# Patient Record
Sex: FEMALE | Race: WHITE
[De-identification: names, ages, dates, MRNs, and addresses within clinical notes are randomized per-mention and may not be internally consistent; named-entity substitution may affect disease eponyms.]

---

## 2018-11-19 ENCOUNTER — HOSPITAL ENCOUNTER (OUTPATIENT)
Dept: HOSPITAL 89 - ER | Age: 83
Setting detail: OBSERVATION
LOS: 1 days | Discharge: HOME | End: 2018-11-20
Attending: INTERNAL MEDICINE | Admitting: INTERNAL MEDICINE
Payer: MEDICARE

## 2018-11-19 VITALS — DIASTOLIC BLOOD PRESSURE: 61 MMHG | SYSTOLIC BLOOD PRESSURE: 116 MMHG

## 2018-11-19 VITALS — SYSTOLIC BLOOD PRESSURE: 111 MMHG | DIASTOLIC BLOOD PRESSURE: 66 MMHG

## 2018-11-19 VITALS
WEIGHT: 130 LBS | WEIGHT: 130 LBS | HEIGHT: 60 IN | HEIGHT: 60 IN | BODY MASS INDEX: 25.52 KG/M2 | BODY MASS INDEX: 25.52 KG/M2

## 2018-11-19 DIAGNOSIS — T50.1X5A: ICD-10-CM

## 2018-11-19 DIAGNOSIS — E87.6: Primary | ICD-10-CM

## 2018-11-19 DIAGNOSIS — Z85.528: ICD-10-CM

## 2018-11-19 LAB — PLATELET COUNT, AUTOMATED: 392 K/UL (ref 150–450)

## 2018-11-19 PROCEDURE — 84484 ASSAY OF TROPONIN QUANT: CPT

## 2018-11-19 PROCEDURE — 84132 ASSAY OF SERUM POTASSIUM: CPT

## 2018-11-19 PROCEDURE — 84155 ASSAY OF PROTEIN SERUM: CPT

## 2018-11-19 PROCEDURE — 84450 TRANSFERASE (AST) (SGOT): CPT

## 2018-11-19 PROCEDURE — 96361 HYDRATE IV INFUSION ADD-ON: CPT

## 2018-11-19 PROCEDURE — 96372 THER/PROPH/DIAG INJ SC/IM: CPT

## 2018-11-19 PROCEDURE — 83735 ASSAY OF MAGNESIUM: CPT

## 2018-11-19 PROCEDURE — 82310 ASSAY OF CALCIUM: CPT

## 2018-11-19 PROCEDURE — 82374 ASSAY BLOOD CARBON DIOXIDE: CPT

## 2018-11-19 PROCEDURE — 82040 ASSAY OF SERUM ALBUMIN: CPT

## 2018-11-19 PROCEDURE — 85025 COMPLETE CBC W/AUTO DIFF WBC: CPT

## 2018-11-19 PROCEDURE — 87493 C DIFF AMPLIFIED PROBE: CPT

## 2018-11-19 PROCEDURE — 93005 ELECTROCARDIOGRAM TRACING: CPT

## 2018-11-19 PROCEDURE — 84075 ASSAY ALKALINE PHOSPHATASE: CPT

## 2018-11-19 PROCEDURE — 84520 ASSAY OF UREA NITROGEN: CPT

## 2018-11-19 PROCEDURE — 84460 ALANINE AMINO (ALT) (SGPT): CPT

## 2018-11-19 PROCEDURE — 96360 HYDRATION IV INFUSION INIT: CPT

## 2018-11-19 PROCEDURE — 99284 EMERGENCY DEPT VISIT MOD MDM: CPT

## 2018-11-19 PROCEDURE — 36415 COLL VENOUS BLD VENIPUNCTURE: CPT

## 2018-11-19 PROCEDURE — 99283 EMERGENCY DEPT VISIT LOW MDM: CPT

## 2018-11-19 PROCEDURE — 82435 ASSAY OF BLOOD CHLORIDE: CPT

## 2018-11-19 PROCEDURE — 84295 ASSAY OF SERUM SODIUM: CPT

## 2018-11-19 PROCEDURE — 82565 ASSAY OF CREATININE: CPT

## 2018-11-19 PROCEDURE — 82947 ASSAY GLUCOSE BLOOD QUANT: CPT

## 2018-11-19 PROCEDURE — 82247 BILIRUBIN TOTAL: CPT

## 2018-11-19 RX ADMIN — SODIUM CHLORIDE PRN MLS/HR: 900 INJECTION, SOLUTION INTRAVENOUS at 17:28

## 2018-11-19 RX ADMIN — POTASSIUM CHLORIDE SCH MLS/HR: 200 INJECTION, SOLUTION INTRAVENOUS at 21:29

## 2018-11-19 NOTE — EKG
FACILITY: [facility] - [place] 

 

PATIENT NAME: KRUT HOPE

: [id number]

MR: [id number]

V: [id number]

EXAM DATE: 

ORDERING PHYSICIAN: STAN WRIGHT

TECHNOLOGIST: 

 

Test Reason : 

Blood Pressure : ***/*** mmHG

Vent. Rate : 083 BPM     Atrial Rate : 083 BPM

   P-R Int : 132 ms          QRS Dur : 074 ms

    QT Int : 402 ms       P-R-T Axes : 065 029 269 degrees

   QTc Int : 472 ms

 

Normal sinus rhythm

T wave abnormality, consider anterolateral ischemia

Prolonged QT

Abnormal ECG

No previous ECGs available

Confirmed by Alexander Friedman ([id number]) on 2018 8:14:34 PM

 

Referred By:             Confirmed By:Alexander Grossman

## 2018-11-19 NOTE — ER REPORT
History and Physical


Time Seen By MD:  16:07


HPI/ROS


CHIEF COMPLAINT: Hypokalemia





HISTORY OF PRESENT ILLNESS: 85-year-old female patient presents to emergency 


room with complaint of hypokalemia. Patient was following up with her primary 


care provider who checked some labs. At that time she was found to have a 


potassium of 2.0. He was his recommendation at that time that she come to the 


emergency room for evaluation and ultimately admission to the hospital. Patient 


states that she is not having any chest pain. She states she does have pain to 


the right leg, she does have a pathologic fracture to that leg. She denies any 


nausea, vomiting or diarrhea. Patient was placed on Lasix for 5 days and 


reviewing her medication list did not have supplemental potassium were ordered. 


Patient denies any fevers, chills, nausea, vomiting or diarrhea.





REVIEW OF SYSTEMS:


Respiratory: No cough, no dyspnea.


Cardiovascular: No chest pain, no palpitations.


Gastrointestinal: No vomiting, no abdominal pain.


Musculoskeletal: As noted above


Allergies:  


Coded Allergies:  


     Penicillins (Verified  Allergy, Unknown, 11/19/18)


Home Meds


Reported Medications


Mirtazapine (REMERON) 15 Mg Tab.rapdis, 7.5 MG PO DAILY


   11/19/18


Sennosides (SENNA) 8.6 Mg Tablet, 8.6 MG PO


   11/19/18


Ibuprofen (IBUPROFEN) 400 Mg Tablet, 1 TAB PO Q8H, TAB


   11/19/18


Nystatin (NYSTATIN) 500,000 Unit Tablet, 275046 UNIT PO


   11/19/18


Lidocaine/Menthol (LIDOPATCH) 1 Each Adh..patch, 1 EACH TP


   11/19/18


Lidocaine Hcl (LIDOCAINE) 35.44 Gm Oint, 5 GM TP


   11/19/18


Tuberculin,Purif.prot.deriv. (APLISOL) 5 Tub Unit/0.1 Ml Vial, 5 TUB ID, VIAL


   11/19/18


Acetaminophen 500 Mg Tab (ACETAMINOPHEN EXTRA STRENGTH) 500 Mg Tablet, 500 MG PO


Q4H, TAB


   11/19/18


Oxycodone Hcl (OXYCONTIN) 10 Mg Tab.er.12h, 5 MG PO Q4-6H for PAIN, TAB


   11/19/18


Magnesium Hydroxide (MILK OF MAGNESIA) 400 Mg/5 Ml Oral.susp, 400 MG PO, BOTTLE


   11/19/18


Past Medical/Surgical History


Patient has a past medical history of renal cell carcinoma.


Patient has surgical history of right hip replacement, cataract surgery, 


hysterectomy, right carpal tunnel release.


Patient has a family medical history of uterine cancer.


Reviewed Nurses Notes:  Yes


Constitutional





Vital Sign - Last 24 Hours








 11/19/18 11/19/18 11/19/18 11/19/18





 16:02 16:11 16:13 16:15


 


Temp   97.4 


 


Pulse ???  85 


 


Resp   14 


 


B/P (MAP)  94/54 (67) 94/54 93/57 (69)


 


Pulse Ox   97 


 


O2 Delivery   Nasal Cannula 


 


    





 11/19/18 11/19/18 11/19/18 11/19/18





 16:17 16:30 16:32 16:45


 


Pulse 82  82 


 


Resp 18  13 


 


B/P (MAP)  75/58 (64)  96/59 (71)


 


Pulse Ox 97  96 





 11/19/18 11/19/18 11/19/18 11/19/18





 16:47 17:00 17:02 17:15


 


Pulse 79  79 


 


Resp 10  12 


 


B/P (MAP)  96/55 (69)  103/57 (72)


 


Pulse Ox 99  100 





 11/19/18 11/19/18 11/19/18 11/19/18





 17:17 17:30 17:32 17:45


 


Pulse 78  81 


 


Resp 14  12 


 


B/P (MAP)  104/59 (74)  104/73 (83)


 


Pulse Ox 99  99 


 


O2 Delivery Nasal Cannula   


 


O2 Flow Rate 2   





 11/19/18 11/19/18 11/19/18 11/19/18





 17:47 17:52 18:00 18:07


 


Pulse 82 82  ???


 


Resp 15 10  15


 


B/P (MAP)   119/63 (81) 


 


Pulse Ox 97 94  





 11/19/18 11/19/18 11/19/18 11/19/18





 18:15 18:22 18:30 18:37


 


Pulse  79  79


 


Resp  8  10


 


B/P (MAP) 105/62 (76)  117/62 (80) 


 


Pulse Ox  99  99





 11/19/18   





 18:45   


 


B/P (MAP) 116/63 (80)   








Physical Exam


  General Appearance: The patient is alert, has no immediate need for airway 


protection and no current signs of toxicity. 


Respiratory: Chest is non tender, lungs are clear to auscultation.


Cardiac: regular rate and rhythm


Gastrointestinal: Abdomen is soft and non tender, no masses, bowel sounds 


normal.


Musculoskeletal:  Neck: Neck is supple and non tender.


   Extremities have full range of motion and are non tender. Patient does have 


tenderness to the right leg.


Skin: No rashes or lesions.





DIFFERENTIAL DIAGNOSIS: After history and physical exam differential diagnosis 


was considered for hypokalemia, pathologic fracture right lower extremity.





Medical Decision Making


Data Points


Result Diagram:  


11/19/18 1656                                                                   


            11/19/18 1656





Laboratory





Hematology








Test


 11/19/18


16:56


 


Red Blood Count


 4.68 M/uL


(4.17-5.56)


 


Mean Corpuscular Volume


 73.6 fL


(80.0-96.0)


 


Mean Corpuscular Hemoglobin


 22.8 pg


(26.0-33.0)


 


Mean Corpuscular Hemoglobin


Concent 31.0 g/dL


(32.0-36.0)


 


Red Cell Distribution Width


 39.2 %


(11.5-14.5)


 


Mean Platelet Volume


 8.5 fL


(7.2-11.1)


 


Neutrophils (%) (Auto)


 88.0 %


(39.4-72.5)


 


Lymphocytes (%) (Auto)


 6.0 %


(17.6-49.6)


 


Monocytes (%) (Auto)


 5.7 %


(4.1-12.4)


 


Eosinophils (%) (Auto)


 0.1 %


(0.4-6.7)


 


Basophils (%) (Auto)


 0.2 %


(0.3-1.4)


 


Nucleated RBC Relative Count


(auto) 0.0 /100WBC 





 


Neutrophils # (Auto)


 10.4 K/uL


(2.0-7.4)


 


Lymphocytes # (Auto)


 0.7 K/uL


(1.3-3.6)


 


Monocytes # (Auto)


 0.7 K/uL


(0.3-1.0)


 


Eosinophils # (Auto)


 0.0 K/uL


(0.0-0.5)


 


Basophils # (Auto)


 0.0 K/uL


(0.0-0.1)


 


Nucleated RBC Absolute Count


(auto) 0.01 K/uL 





 


Peripheral Blood Smear Yes Y/N 


 


Sodium Level


 140 mmol/L


(137-145)


 


Potassium Level


 2.5 mmol/L


(3.5-5.0)


 


Chloride Level


 101 mmol/L


()


 


Carbon Dioxide Level


 31 mmol/L


(22-31)


 


Blood Urea Nitrogen


 20 mg/dl


(7-18)


 


Creatinine


 0.60 mg/dl


(0.52-1.04)


 


Glomerular Filtration Rate


Calc > 60.0 





 


Random Glucose


 83 mg/dl


()


 


Calcium Level


 7.8 mg/dl


(8.4-10.2)


 


Magnesium Level


 2.6 mg/dl


(1.7-2.2)


 


Total Bilirubin


 0.6 mg/dl


(0.2-1.3)


 


Aspartate Amino Transf


(AST/SGOT) 27 U/L (0-35) 





 


Alanine Aminotransferase


(ALT/SGPT) 21 U/L (0-56) 





 


Alkaline Phosphatase


 385 U/L


(0-126)


 


Troponin I 0.031 ng/ml 


 


Total Protein


 5.7 g/dl


(6.3-8.2)


 


Albumin


 2.2 g/dl


(3.5-5.0)








Chemistry








Test


 11/19/18


16:56


 


White Blood Count


 11.9 k/uL


(4.5-11.0)


 


Red Blood Count


 4.68 M/uL


(4.17-5.56)


 


Hemoglobin


 10.7 g/dL


(12.0-16.0)


 


Hematocrit


 34.5 %


(34.0-47.0)


 


Mean Corpuscular Volume


 73.6 fL


(80.0-96.0)


 


Mean Corpuscular Hemoglobin


 22.8 pg


(26.0-33.0)


 


Mean Corpuscular Hemoglobin


Concent 31.0 g/dL


(32.0-36.0)


 


Red Cell Distribution Width


 39.2 %


(11.5-14.5)


 


Platelet Count


 392 K/uL


(150-450)


 


Mean Platelet Volume


 8.5 fL


(7.2-11.1)


 


Neutrophils (%) (Auto)


 88.0 %


(39.4-72.5)


 


Lymphocytes (%) (Auto)


 6.0 %


(17.6-49.6)


 


Monocytes (%) (Auto)


 5.7 %


(4.1-12.4)


 


Eosinophils (%) (Auto)


 0.1 %


(0.4-6.7)


 


Basophils (%) (Auto)


 0.2 %


(0.3-1.4)


 


Nucleated RBC Relative Count


(auto) 0.0 /100WBC 





 


Neutrophils # (Auto)


 10.4 K/uL


(2.0-7.4)


 


Lymphocytes # (Auto)


 0.7 K/uL


(1.3-3.6)


 


Monocytes # (Auto)


 0.7 K/uL


(0.3-1.0)


 


Eosinophils # (Auto)


 0.0 K/uL


(0.0-0.5)


 


Basophils # (Auto)


 0.0 K/uL


(0.0-0.1)


 


Nucleated RBC Absolute Count


(auto) 0.01 K/uL 





 


Peripheral Blood Smear Yes Y/N 


 


Glomerular Filtration Rate


Calc > 60.0 





 


Calcium Level


 7.8 mg/dl


(8.4-10.2)


 


Magnesium Level


 2.6 mg/dl


(1.7-2.2)


 


Total Bilirubin


 0.6 mg/dl


(0.2-1.3)


 


Aspartate Amino Transf


(AST/SGOT) 27 U/L (0-35) 





 


Alanine Aminotransferase


(ALT/SGPT) 21 U/L (0-56) 





 


Alkaline Phosphatase


 385 U/L


(0-126)


 


Troponin I 0.031 ng/ml 


 


Total Protein


 5.7 g/dl


(6.3-8.2)


 


Albumin


 2.2 g/dl


(3.5-5.0)











EKG/Imaging


EKG Interpretation


12 lead EKG:


      Rhythm: normal sinus rhythm with a ventricular rate of 83 bpm


      Axis: normal 


      QRS: normal


      ST segments: Flattened T waves consistent with hypokalemia.





ED Course/Re-evaluation


ED Course


Patient was admitted on exam room, history and physical were obtained. Differen


tial diagnoses were considered. On examination patient is alert and oriented, 


lungs are clear, abdomen is soft and nontender. Lab work was repeated as patient


was sent over for a low potassium. Again the lab work confirmed patient does 


have a low potassium of 2.5. I discussed the case with Dr. Castro, 


hospitalist. He did agree to accept the patient for admission with diagnosis of 


hypokalemia. Patient was started on K-rider here in the emergency room. Patient 


verbalized understanding and agreement with plan. I do believe that the 


underlying cause of the hypokalemia is the Lasix that she was on for 5 days.


Decision to Disposition Date:  Nov 19, 2018


Decision to Disposition Time:  17:52





Depart


Departure


Latest Vital Signs





Vital Signs








  Date Time  Temp Pulse Resp B/P (MAP) Pulse Ox O2 Delivery O2 Flow Rate FiO2


 


11/19/18 18:45    116/63 (80)    


 


11/19/18 18:37  79 10  99   


 


11/19/18 17:17      Nasal Cannula 2 


 


11/19/18 16:13 97.4       








Impression:  


   Primary Impression:  


   Hypokalemia


Condition:  Condition Unchanged


Disposition:  Admitted from ER


Referrals:  


MAYRA MCKEON MD (PCP)











STAN WRIGHT                Nov 19, 2018 16:07

## 2018-11-19 NOTE — HISTORY & PHYSICAL
History of Present Illness


Chief Complaint


low potassium


History of Present Illness


85F presented to [facility] ER from [facility] with low potassium. Reported 


2.0 on lab there today, no symptoms. Repeat 2.5, recommended for observation and


PRN replacement. Recently had 5 day course Lasix without supplementation. PMHx 


significant for RCC metastatic from 11.2016.





History


Problems:  


(1) Renal cell carcinoma


Status:  Chronic


Home Meds


Reported Medications


Mirtazapine (REMERON) 15 Mg Tab.rapdis, 7.5 MG PO DAILY


   11/19/18


Sennosides (SENNA) 8.6 Mg Tablet, 8.6 MG PO


   11/19/18


Ibuprofen (IBUPROFEN) 400 Mg Tablet, 1 TAB PO Q8H, TAB


   11/19/18


Nystatin (NYSTATIN) 500,000 Unit Tablet, 140912 UNIT PO


   11/19/18


Lidocaine/Menthol (LIDOPATCH) 1 Each Adh..patch, 1 EACH TP


   11/19/18


Lidocaine Hcl (LIDOCAINE) 35.44 Gm Oint, 5 GM TP


   11/19/18


Tuberculin,Purif.prot.deriv. (APLISOL) 5 Tub Unit/0.1 Ml Vial, 5 TUB ID, VIAL


   11/19/18


Acetaminophen 500 Mg Tab (ACETAMINOPHEN EXTRA STRENGTH) 500 Mg Tablet, 500 MG PO


Q4H, TAB


   11/19/18


Oxycodone Hcl (OXYCONTIN) 10 Mg Tab.er.12h, 5 MG PO Q4-6H for PAIN, TAB


   11/19/18


Magnesium Hydroxide (MILK OF MAGNESIA) 400 Mg/5 Ml Oral.susp, 400 MG PO, BOTTLE


   11/19/18


Allergies:  


Coded Allergies:  


     Penicillins (Verified  Allergy, Unknown, 11/19/18)





Review of Systems


All Systems Reviewed/Normal:  Yes, Except as Noted


Constitutional:  No Weight Loss


Cardiovascular:  No Chest Pain, No Palpitations


Musculoskeletal:  No Pain





Exam


Vital Signs





Vital Signs








  Date Time  Temp Pulse Resp B/P (MAP) Pulse Ox O2 Delivery O2 Flow Rate FiO2


 


11/19/18 19:42  ???      


 


11/19/18 19:22   11  99   


 


11/19/18 19:15    113/63 (80)    


 


11/19/18 17:17      Nasal Cannula 2 


 


11/19/18 16:13 97.4       








General Appearance:  Awake, Afebrile, Other (Sleeping comfortably, appears 


emaciated, undernourished. )


Neuro:  No Gross deficits


ENT:  Normal (hirsutism)


Cardiovascular:  Normal Rhythm & Peripheral Pulses


Respiratory:  No Respiratory Distress


GI:  Abd Soft and Non-Tender


Musculoskeletal:  No Weakness/Pain (Imobilizer for femur Fx, likely pathologic 


Fx)


Extremities:  Soft and Non Tender, Warm, Pulses, Perfused, Edema


Integumentary:  Skin Intact without Lesion / Mass





Medical Decision Making


Data Points


Result Diagram:  


11/19/18 1656                                                                   


            11/19/18 1656








Assessment and Plan


Problems:  


(1) Hypokalemia


Status:  Acute


Assessment & Plan:  K 2.5 on admission, will monitor on telemetry while 


replacing. Likely from poor intake given appearance and Lasix. 





(2) Renal cell carcinoma


Status:  Chronic


Assessment & Plan:  Metastatic per records 11.2016. Family elected palliation. 


Elevated alk phos likely indication of joel mets. Has pahtologic Fx as well of 


femur. 








Venous Thromboembolism


Antithrombotics


Is Pt On Any Antithrombotics?:  Yes





Exam


Sepsis Risk:  No Definite Risk











IRVIN KAYDEN CASTILLO DO       Nov 19, 2018 19:58

## 2018-11-20 VITALS — SYSTOLIC BLOOD PRESSURE: 116 MMHG | DIASTOLIC BLOOD PRESSURE: 62 MMHG

## 2018-11-20 VITALS — SYSTOLIC BLOOD PRESSURE: 105 MMHG | DIASTOLIC BLOOD PRESSURE: 58 MMHG

## 2018-11-20 RX ADMIN — POTASSIUM CHLORIDE SCH MLS/HR: 200 INJECTION, SOLUTION INTRAVENOUS at 01:58

## 2018-11-20 NOTE — HOSPITALIST DEPART
Discharge Summary


Reason for Hosp/Final Diag:  


(1) Hypokalemia


Status:  Acute


Hospital Course & Plan:  K 2.5 on admission. Likely from poor intake and Lasix. 


Her Mg was wnl.  She was replaced with IV and PO KCL.  Now it is wnl.  She will 


go back to the [facility] and have oral KCL in powder form for a week.  BMP on 11/23.





(2) Renal cell carcinoma


Status:  Chronic


Hospital Course & Plan:  Metastatic per records 11.2016. Family elected 


palliation. Elevated alk phos likely indication of joel mets. Has pahtologic Fx


as well of femur. 





Departure


Weight (Pounds):  130


Result Diagram:  


11/19/18 1656 11/20/18 0543














Item Value  Date Time


 


Neutrophils (%) (Auto) 88.0 % H 11/19/18 1656


 


Lymphocytes (%) (Auto) 6.0 % L 11/19/18 1656


 


Monocytes (%) (Auto) 5.7 % 11/19/18 1656


 


Eosinophils (%) (Auto) 0.1 % L 11/19/18 1656


 


Basophils (%) (Auto) 0.2 % L 11/19/18 1656


 


Nucleated RBC Relative Count (auto) 0.0 /100WBC 11/19/18 1656


 


C. difficile Toxin B Gene (PCR) Negative 11/19/18 2200


 


Potassium Level 2.5 mmol/L *L 11/19/18 1656


 


Potassium Level 3.5 mmol/L 11/20/18 0543


 


Magnesium Level 2.6 mg/dl H 11/19/18 1656


 


Total Bilirubin 0.6 mg/dl 11/19/18 1656


 


Aspartate Amino Transf (AST/SGOT) 27 U/L 11/19/18 1656


 


Alanine Aminotransferase (ALT/SGPT) 21 U/L 11/19/18 1656


 


Alkaline Phosphatase 385 U/L H 11/19/18 1656


 


Troponin I 0.031 ng/ml 11/19/18 1656


 


Total Protein 5.7 g/dl L 11/19/18 1656


 


Albumin 2.2 g/dl L 11/19/18 1656








EKG


Vent. Rate : 083 BPM     Atrial Rate : 083 BPM


   P-R Int : 132 ms          QRS Dur : 074 ms


    QT Int : 402 ms       P-R-T Axes : 065 029 269 degrees


   QTc Int : 472 ms


 


Normal sinus rhythm


T wave abnormality, consider anterolateral ischemia


Prolonged QT


Abnormal ECG


No previous ECGs available


Confirmed by Alexander Friedman ([id number]) on 11/19/2018 8:14:34 PM


 


Referred By:             Confirmed By:Alexander Grossman


Condition:  Improved


Discharge:  Home





Discharge Instructions


Home Meds


Active Scripts


Potassium Chloride (POTASSIUM CHLORIDE) 20 Meq Packet, 20 MEQ PO QDAY, #7 PACKET


   Prov:MICHOACANO SILVA MD         11/20/18


Reported Medications


Mirtazapine (REMERON) 15 Mg Tab.rapdis, 7.5 MG PO DAILY


   11/19/18


Sennosides (SENNA) 8.6 Mg Tablet, 8.6 MG PO Q12H PRN for constipation


   11/19/18


Nystatin (NYSTATIN) 500,000 Unit Tablet, [id number] UNIT PO


   11/19/18


Lidocaine/Menthol (LIDOPATCH) 1 Each Adh..patch, 1 EACH TP


   11/19/18


Lidocaine Hcl (LIDOCAINE) 35.44 Gm Oint, 5 GM TP


   11/19/18


Acetaminophen 500 Mg Tab (ACETAMINOPHEN EXTRA STRENGTH) 500 Mg Tablet, 500 MG PO


Q4H, TAB


   11/19/18


Oxycodone Hcl (OXYCONTIN) 10 Mg Tab.er.12h, 5 MG PO Q4-6H for PAIN, TAB


   11/19/18


Magnesium Hydroxide (MILK OF MAGNESIA) 400 Mg/5 Ml Oral.susp, 400 MG PO, BOTTLE


   11/19/18


Discontinued Reported Medications


Ibuprofen (IBUPROFEN) 400 Mg Tablet, 1 TAB PO Q8H, TAB


   11/19/18


Tuberculin,Purif.prot.deriv. (APLISOL) 5 Tub Unit/0.1 Ml Vial, 5 TUB ID, VIAL


   11/19/18


Diet:  Regular


Activity:  As Tolerated


Special Instructions:  


BMP on 11/23.





Follow up with PCP in a couple of days to a week.


Copies to:   CHALO OSORIO MD ;





Venous Thromboembolism


Antithrombotics


Is Pt On Any Antithrombotics?:  Yes











MICHOACANO SILVA MD               Nov 20, 2018 12:04

## 2018-11-20 NOTE — MEDICAL NUTRITION THERAPY
Nutrition Anthropometrics


Height (Inches):  60.00


Height (Calculated Centimeters:  152.073655


Weight (Pounds):  130


Weight (Calculated Kilograms):  58.967


BMI:  25.4


Teodoro Nutrition Score:         Probably Inadequate 


Teodoro Nutrition Risk Score:  12


Dietary Referral


Nutrition Risk Factors:      


Nutrition Risk Comment:





Physical Findings


Physical Appearance:  Overweight BMI 25-29


Skin Appearance


Skin Appearance:


Edema


Edema Location Modifier: Both 


Edema Location:              Leg 


Type of Edema:                 


Degree of Edema:            3+


Gastrointestinal Symptoms


GI Symtoms:                Diarrhea, Change in Bowel Pattern 


Tube Present:               


Bowel Sounds:              


Recent Bowel Pattern:    


Stool Characteristics:    Brown, Liquid





Nutritional Diagnosis


Nutritional Risk Acuity 2:  Head/Neck/GI Cancer (Renal cell carninoma)


Nutritional Risk Acuity 3:  Fx & > 80 yrs, Cancer


Past Medical History:  


Hx of renal cell carcinoma, pneumonia


Nutritional Acuity:  2-Moderate


Nutrition Diagnosis:  Increased Nutrient Needs


Nutrition Etiology:  Physiological Causes


Nutrition Problem/Etiology/Sym:  


Increased nutrient needs r/t physiological causes AEB Alb 2.2, BG 65


Energy Requirement:  1200 (M-SJ * 1.2)


Protein Requirement:  60 (1g/kg)


Fluid Requirement:  1200 (1ml/kcal)


Diet Type:  Diet as Tolerated KIA/REG


Nutrition Intervention:  Cont diet as ordered, Encourage intake, Between meal 


supplement


Diet Comment To [facility]:  


PROVIDE NUTR SUPPLEMENT





Nutrition Monitoring & Eval


Nutrition Goals:  Drink > 1200 cc/day, Eat % Meal


RD Patient Assessment Time:  15 minutes


RD Assessment Type:  RD Assessment


Patient Nutrition Acuity:  2-Moderate


Follow Up Date:  Nov 24, 2018


Nutritional Comment:  


11/20 Pt admitted for hypokalemia. Pt was on 5 days of Lasix without a K+  


supplement. Pt admitted with 2.5 K+ on 11/19, up to 3.5 today. Pt also has  


renal cell carcinoma, and pathologic fx of femur. Pt denies  any n/v/d. Pt  


on KIA, no intake charted. Pt's height was taken from previous admission.  


Alb very low at 2.2 and BG low at 65 this am. Pt seems to be  


undernourished, will follow. HILDA FRANK                    Nov 20, 2018 09:21

## 2018-11-23 ENCOUNTER — HOSPITAL ENCOUNTER (OUTPATIENT)
Dept: HOSPITAL 89 - AMB | Age: 83
End: 2018-11-23
Payer: MEDICARE

## 2018-11-23 ENCOUNTER — HOSPITAL ENCOUNTER (EMERGENCY)
Dept: HOSPITAL 89 - ER | Age: 83
Discharge: HOME | End: 2018-11-23
Payer: MEDICARE

## 2018-11-23 ENCOUNTER — HOSPITAL ENCOUNTER (OUTPATIENT)
Dept: HOSPITAL 89 - AMB | Age: 83
End: 2018-11-23
Payer: COMMERCIAL

## 2018-11-23 ENCOUNTER — HOSPITAL ENCOUNTER (OUTPATIENT)
Dept: HOSPITAL 89 - ZZSENDIN | Age: 83
End: 2018-11-23
Attending: FAMILY MEDICINE
Payer: MEDICARE

## 2018-11-23 VITALS — SYSTOLIC BLOOD PRESSURE: 138 MMHG | DIASTOLIC BLOOD PRESSURE: 74 MMHG

## 2018-11-23 VITALS — BODY MASS INDEX: 25.39 KG/M2 | BODY MASS INDEX: 25.39 KG/M2 | BODY MASS INDEX: 25.39 KG/M2

## 2018-11-23 VITALS — BODY MASS INDEX: 25.39 KG/M2

## 2018-11-23 DIAGNOSIS — M21.951: Primary | ICD-10-CM

## 2018-11-23 DIAGNOSIS — R60.0: ICD-10-CM

## 2018-11-23 DIAGNOSIS — M25.551: Primary | ICD-10-CM
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
## 2018-11-23 DIAGNOSIS — S72.491K: Primary | ICD-10-CM

## 2018-11-23 DIAGNOSIS — E87.6: Primary | ICD-10-CM

## 2018-11-23 PROCEDURE — 82374 ASSAY BLOOD CARBON DIOXIDE: CPT

## 2018-11-23 PROCEDURE — 84295 ASSAY OF SERUM SODIUM: CPT

## 2018-11-23 PROCEDURE — 84132 ASSAY OF SERUM POTASSIUM: CPT

## 2018-11-23 PROCEDURE — 84520 ASSAY OF UREA NITROGEN: CPT

## 2018-11-23 PROCEDURE — 82435 ASSAY OF BLOOD CHLORIDE: CPT

## 2018-11-23 PROCEDURE — 99284 EMERGENCY DEPT VISIT MOD MDM: CPT

## 2018-11-23 PROCEDURE — 82947 ASSAY GLUCOSE BLOOD QUANT: CPT

## 2018-11-23 PROCEDURE — 82565 ASSAY OF CREATININE: CPT

## 2018-11-23 PROCEDURE — 82310 ASSAY OF CALCIUM: CPT

## 2018-11-23 PROCEDURE — 70450 CT HEAD/BRAIN W/O DYE: CPT

## 2018-11-23 NOTE — RADIOLOGY IMAGING REPORT
FACILITY: [facility] 

 

PATIENT NAME: Rashida Burns

: 1932

MR: [id number]

V: [id number]

EXAM DATE: 

ORDERING PHYSICIAN: ASHLEY NEVAREZ

TECHNOLOGIST: 

 

[place] [facility] [place]

Patient: Rashida Burns

: 1932

MRN: [id number]

Visit/Account:[id number]

Date of Sevice: 2018

 

ACCESSION #: [id number]

 

FEMUR RIGHT, HIP RIGHT

 

Indication: Fall, pain

 

Comparison: None.

 

Findings: There is a transverse fracture at the distal femur, with posterior displacement of the dist
al fracture fragment.

 

There are postoperative changes from a right total hip arthroplasty, with the acetabular and femoral 
components in good position.

 

There are degenerative changes with narrowing of the left hip joint space.  Bones of the pelvis are i
ntact.

 

 

IMPRESSION:

1.  Right Femur radiograph: Transverse fracture distal femur, with the distal fracture fragment displ
aced posteriorly.

2.  Right hip radiograph: Postoperative changes right total hip arthroplasty, with hardware in good p
osition.

3.  Severe degenerative changes left hip.

 

Report Dictated By: Ian Queen at 2018 9:29 AM

 

Report E-Signed By: Ian Queen  at 2018 9:32 AM

 

WSN:[id number]

## 2018-11-23 NOTE — RADIOLOGY IMAGING REPORT
FACILITY: [facility] 

 

PATIENT NAME: Rashida Burns

: 1932

MR: [id number]

V: [id number]

EXAM DATE: 

ORDERING PHYSICIAN: ASHLEY NEVAREZ

TECHNOLOGIST: 

 

Location: [facility]

Patient: Rashida Burns

: 1932

MRN: [id number]

Visit/Account:[id number]

Date of Sevice: 2018

 

ACCESSION #: [id number]

 

CT Head without contrast

 

Indication: Fall. Headache.

 

Comparison:  None available

 

Technique:  Axial CT images were obtained through the brain from the skull base to the vertex without
 administration of IV contrast.  Reformatted coronal and sagittal images were also obtained.

One of the following dose optimization techniques was utilized in the performance of this exam: autom
ated exposure control; adjustment of the mA and/or kV according to the patient's size; or use of an i
terative reconstruction technique.  Specific details can be referenced in the facility's radiology CT
 exam operational policy.

 

Findings:

No evidence of mass, mass effect, or midline shift.

No acute intracranial hemorrhage or acute territorial infarction.

Extensive periventricular deep matter chronic ischemic changes noted. Moderate atherosclerotic calcif
ications bilateral intracranial internal carotid arteries as well.

 

The visualized paranasal sinuses and mastoid air cells are clear.

 

IMPRESSION:

1. No acute intracranial abnormality.

2. Extensive periventricular deep white matter chronic ischemic changes noted.

 

 

Report Dictated By: Jovany Dominique MD at 2018 9:29 AM

 

Report E-Signed By: Jovany Dominique MD  at 2018 9:31 AM

 

WSN:[id number]

## 2018-11-23 NOTE — RADIOLOGY IMAGING REPORT
FACILITY: [facility] [place] 

 

PATIENT NAME: Rashida Burns

: 1932

MR: [id number]

V: [id number]

EXAM DATE: 

ORDERING PHYSICIAN: ASHLEY NEVAREZ

TECHNOLOGIST: 

 

Location[facility]

Patient: Rashida Burns

: 1932

MRN: [id number]

Visit/Account:[id number]

Date of Sevice: 2018

 

ACCESSION #: [id number]

 

FEMUR RIGHT, HIP RIGHT

 

Indication: Fall, pain

 

Comparison: None.

 

Findings: There is a transverse fracture at the distal femur, with posterior displacement of the dist
al fracture fragment.

 

There are postoperative changes from a right total hip arthroplasty, with the acetabular and femoral 
components in good position.

 

There are degenerative changes with narrowing of the left hip joint space.  Bones of the pelvis are i
ntact.

 

 

IMPRESSION:

1.  Right Femur radiograph: Transverse fracture distal femur, with the distal fracture fragment displ
aced posteriorly.

2.  Right hip radiograph: Postoperative changes right total hip arthroplasty, with hardware in good p
osition.

3.  Severe degenerative changes left hip.

 

Report Dictated By: Ian Queen at 2018 9:29 AM

 

Report E-Signed By: Ian Queen  at 2018 9:32 AM

 

WSN:[id number]

## 2018-11-23 NOTE — ER REPORT
History and Physical


Time Seen By MD:  07:43


HPI/ROS


CHIEF COMPLAINT: Evaluation status post fall. Also history of low potassium





HISTORY OF PRESENT ILLNESS: Patient is a 85-year-old female who was sent from 


skilled nursing facility for evaluation of right lower extremity pain status 


post fall. Patient had recent admission on  for low potassium. 


Patient has a history of metastatic anal cell cancer. She also has a known 


pathological fracture to the lower end of the right femur. Patient is in a 


external J scope brace. Patient is DO NOT RESUSCITATE and family has recommended


only palliative type care.





REVIEW OF SYSTEMS:


Respiratory: No cough, no dyspnea.


Cardiovascular: No chest pain, no palpitations.


Gastrointestinal: No vomiting, no abdominal pain.


Musculoskeletal: Right lower extremity pain


Allergies:  


Coded Allergies:  


     Penicillins (Verified  Allergy, Unknown, 18)


Home Meds


Reported Medications


Furosemide (FUROSEMIDE) 20 Mg Tablet, 1 TAB PO DAILY, TAB


   18


Mirtazapine (REMERON) 15 Mg Tab.rapdis, 7.5 MG PO DAILY


   18


Sennosides (SENNA) 8.6 Mg Tablet, 8.6 MG PO Q12H PRN for constipation


   18


Nystatin (NYSTATIN) 500,000 Unit Tablet, 262206 UNIT PO


   18


Lidocaine/Menthol (LIDOPATCH) 1 Each Adh..patch, 1 EACH TP


   18


Acetaminophen 500 Mg Tab (ACETAMINOPHEN EXTRA STRENGTH) 500 Mg Tablet, 500 MG PO


Q4H, TAB


   18


Oxycodone Hcl (OXYCONTIN) 10 Mg Tab.er.12h, 5 MG PO Q4H for PAIN, TAB


   18


Magnesium Hydroxide (MILK OF MAGNESIA) 400 Mg/5 Ml Oral.susp, 400 MG PO, BOTTLE


   18


Discontinued Reported Medications


Lidocaine Hcl (LIDOCAINE) 35.44 Gm Oint, 5 GM TP


   18


Ibuprofen (IBUPROFEN) 400 Mg Tablet, 1 TAB PO Q8H, TAB


   18


Tuberculin,Purif.prot.deriv. (APLISOL) 5 Tub Unit/0.1 Ml Vial, 5 TUB ID, VIAL


   18


Discontinued Scripts


Potassium Chloride (POTASSIUM CHLORIDE) 20 Meq Packet, 20 MEQ PO QDAY, #7 PACKET


   Prov:MICHOACANO SILVA MD         18


Past Medical/Surgical History


History of metastatic renal cell cancer, history of pathological fracture to 


right femur


Constitutional





Vital Sign - Last 24 Hours








 18





 07:29 07:42 08:00 08:15


 


Temp 98.8   


 


Pulse 94   89


 


Resp 12   


 


B/P (MAP) 136/66 136/66 (89) 128/67 (87) 


 


Pulse Ox 100   100


 


O2 Delivery Nasal Cannula   


 


    





 18





 08:30 08:45 09:30 09:45


 


Pulse 90 88  83


 


B/P (MAP) 135/80 (98)  126/68 (87) 


 


Pulse Ox 100 98  100





 18  





 10:00 10:15  


 


Pulse 83 82  


 


B/P (MAP) 138/74 (95)   


 


Pulse Ox 100 100  








Physical Exam


  General Appearance: The patient is alert, has no immediate need for airway 


protection and no current signs of toxicity.  


Eyes: Pupils equal and round no injection.


Respiratory: Chest is non tender, lungs are clear to auscultation.


Cardiac: regular rate and rhythm [ ]


Gastrointestinal: Abdomen is soft and non tender, no masses, bowel sounds nor


mal.


Musculoskeletal:  Neck: Neck is supple and non tender.


   Extremities: Right lower extremity is in external fixation device


Skin: No rashes or lesions.


[ ]





Medical Decision Making


EKG/Imaging


Imaging


FACILITY: [facility] 


 


PATIENT NAME: Rashida Burns


: 1932


MR: [id number]


V: [id number]


EXAM DATE: 


ORDERING PHYSICIAN: ASHLEY NEVAREZ


TECHNOLOGIST: 


 


Location: [facility] [place]


Patient: Rashida Burns


: 1932


MRN: [id number]


Visit/Account:[id number]


Date of Sevice: 2018


 


ACCESSION #: [id number]


 


FEMUR RIGHT, HIP RIGHT


 


Indication: Fall, pain


 


Comparison: None.


 


Findings: There is a transverse fracture at the distal femur, with posterior 


displacement of the distal fracture fragment.


 


There are postoperative changes from a right total hip arthroplasty, with the 


acetabular and femoral components in good position.


 


There are degenerative changes with narrowing of the left hip joint space.  


Bones of the pelvis are intact.


 


 


IMPRESSION:


1.  Right Femur radiograph: Transverse fracture distal femur, with the distal 


fracture fragment displaced posteriorly.


2.  Right hip radiograph: Postoperative changes right total hip arthroplasty, 


with hardware in good position.


3.  Severe degenerative changes left hip.


 


Report Dictated By: Ian Queen at 2018 9:29 AM


 


Report E-Signed By: Ian Queen  at 2018 9:32 AM


 


WSN:[id number]


FACILITY: [facility] 


 


PATIENT NAME: Rashida Burns


: 1932


MR: [id number]


V: [id number]


EXAM DATE: 


ORDERING PHYSICIAN: ASHLEY NEVAREZ


TECHNOLOGIST: 


 


Location: [facility] [place]


Patient: Rashida Burns


: 1932


MRN: [id number]


Visit/Account:[id number]


Date of Sevice: 2018


 


ACCESSION #: [id number]


 


CT Head without contrast


 


Indication: Fall. Headache.


 


Comparison:  None available


 


Technique:  Axial CT images were obtained through the brain from the skull base 


to the vertex without administration of IV contrast.  Reformatted coronal and 


sagittal images were also obtained.


One of the following dose optimization techniques was utilized in the 


performance of this exam: automated exposure control; adjustment of the mA 


and/or kV according to the patient's size; or use of an iterative reconstruction


technique.  Specific details can be referenced in the facility's radiology CT 


exam operational policy.


 


Findings:


No evidence of mass, mass effect, or midline shift.


No acute intracranial hemorrhage or acute territorial infarction.


Extensive periventricular deep matter chronic ischemic changes noted. Moderate 


atherosclerotic calcifications bilateral intracranial internal carotid arteries 


as well.


 


The visualized paranasal sinuses and mastoid air cells are clear.


 


IMPRESSION:


1. No acute intracranial abnormality.


2. Extensive periventricular deep white matter chronic ischemic changes noted.


 


 


Report Dictated By: Jovany Dominique MD at 2018 9:29 AM


 


Report E-Signed By: Jovany Dominique MD  at 2018 9:31 AM


 


WSN:[id number]





ED Course/Re-evaluation


ED Course


 2018 8:22:07 am plan at this time will be to check electrolytes we'll 


also x-ray right lower extremity to look for any type of new injury or fracture.


X-ray does demonstrate a distal femur fracture which apparently is not acute as 


this was on her previous diagnosis sheet and was noted on her prior visit 


 and through her admission on  in the hospital. No new 


fractures were identified. Potassium is 3.8. We will send patient back to 


skilled nursing facility.


Decision to Disposition Date:  2018


Decision to Disposition Time:  10:12





Depart


Departure


Latest Vital Signs





Vital Signs








  Date Time  Temp Pulse Resp B/P (MAP) Pulse Ox O2 Delivery O2 Flow Rate FiO2


 


18 10:15  82   100   


 


18 10:00    138/74 (95)    


 


18 07:29 98.8  12   Nasal Cannula  








Impression:  


   Primary Impression:  


   Fall


   Additional Impression:  


   Femur fracture, right


Condition:  Condition Unchanged


Disposition:  HOME OR SELF-CARE


Referrals:  


CHALO OSORIO MD (PCP)


Patient Instructions:  Fall Prevention (GEN), Fall Prevention for Older Adults 


(ED)





Problem Qualifiers








   Primary Impression:  


   Fall


   Encounter type:  initial encounter  Qualified Codes:  W19.XXXA - Unspecified 


   fall, initial encounter


   Additional Impression:  


   Femur fracture, right


   Encounter type:  subsequent encounter  Femur location:  distal  Fracture 


   type:  closed  Fracture morphology:  other fracture  Fracture healing:  with 


   nonunion  Qualified Codes:  S72.491K - Other fracture of lower end of right 


   femur, subsequent encounter for closed fracture with nonunion








ASHLEY NEVAREZ MD             2018 07:36

## 2018-11-29 ENCOUNTER — HOSPITAL ENCOUNTER (OUTPATIENT)
Dept: HOSPITAL 89 - AMB | Age: 83
End: 2018-11-29
Payer: MEDICARE

## 2018-11-29 VITALS — BODY MASS INDEX: 25.39 KG/M2

## 2018-11-29 DIAGNOSIS — C41.9: Primary | ICD-10-CM
